# Patient Record
Sex: MALE | Race: WHITE | Employment: STUDENT | URBAN - METROPOLITAN AREA
[De-identification: names, ages, dates, MRNs, and addresses within clinical notes are randomized per-mention and may not be internally consistent; named-entity substitution may affect disease eponyms.]

---

## 2020-03-03 ENCOUNTER — HOSPITAL ENCOUNTER (OUTPATIENT)
Age: 20
Discharge: HOME | End: 2020-03-03
Attending: FAMILY MEDICINE
Payer: COMMERCIAL

## 2020-03-03 VITALS
RESPIRATION RATE: 18 BRPM | OXYGEN SATURATION: 96 % | SYSTOLIC BLOOD PRESSURE: 128 MMHG | HEART RATE: 101 BPM | TEMPERATURE: 101 F | DIASTOLIC BLOOD PRESSURE: 83 MMHG

## 2020-03-03 DIAGNOSIS — J10.1 INFLUENZA B: Primary | ICD-10-CM

## 2020-03-03 DIAGNOSIS — J02.9 ACUTE PHARYNGITIS, UNSPECIFIED ETIOLOGY: ICD-10-CM

## 2020-03-03 LAB
POCT INFLUENZA A: NEGATIVE
POCT INFLUENZA B: POSITIVE
POCT RAPID STREP: NEGATIVE

## 2020-03-03 PROCEDURE — 99204 OFFICE O/P NEW MOD 45 MIN: CPT

## 2020-03-03 PROCEDURE — 87147 CULTURE TYPE IMMUNOLOGIC: CPT | Performed by: FAMILY MEDICINE

## 2020-03-03 PROCEDURE — 87081 CULTURE SCREEN ONLY: CPT | Performed by: FAMILY MEDICINE

## 2020-03-03 PROCEDURE — 87502 INFLUENZA DNA AMP PROBE: CPT | Performed by: FAMILY MEDICINE

## 2020-03-03 PROCEDURE — 87430 STREP A AG IA: CPT | Performed by: FAMILY MEDICINE

## 2020-03-03 RX ORDER — OSELTAMIVIR PHOSPHATE 75 MG/1
75 CAPSULE ORAL 2 TIMES DAILY
Qty: 10 CAPSULE | Refills: 0 | Status: SHIPPED | OUTPATIENT
Start: 2020-03-03 | End: 2020-03-03

## 2020-03-03 RX ORDER — OSELTAMIVIR PHOSPHATE 75 MG/1
75 CAPSULE ORAL 2 TIMES DAILY
Qty: 10 CAPSULE | Refills: 0 | Status: SHIPPED | OUTPATIENT
Start: 2020-03-03 | End: 2020-03-08

## 2020-03-03 NOTE — ED PROVIDER NOTES
Patient presents with:  Sore Throat  Headache  Fever  Cough/URI    HPI:     Kendrick Pack is a 22year old male who presents with for chief complaint of fever, chills, nasal congestion, coryza, rhinorrhea, sore throat, cough, headache, myalgias, fatigue, ma mucosa moist.   Neck: no adenopathy  Lungs: clear to auscultation bilaterally. No wheezing, rhonchi or crackles. No chest wall retractions. No respiratory distress.  No tachypnea noted  Heart: S1, S2 normal, no murmur, click, rub or gallop, regular rate and not better in 3-4 days or as needed  Monitor for signs and symptoms of pneumonia  All results reviewed and discussed with patient. See AVS for detailed discharge instructions for your condition today.       Follow Up with:  Wally Armstrong, [address]

## 2020-03-05 RX ORDER — AMOXICILLIN 875 MG/1
875 TABLET, COATED ORAL 2 TIMES DAILY
Qty: 20 TABLET | Refills: 0 | Status: SHIPPED | OUTPATIENT
Start: 2020-03-05 | End: 2020-03-15

## 2023-05-24 ENCOUNTER — APPOINTMENT (OUTPATIENT)
Dept: GENERAL RADIOLOGY | Age: 23
End: 2023-05-24
Attending: NURSE PRACTITIONER
Payer: COMMERCIAL

## 2023-05-24 ENCOUNTER — HOSPITAL ENCOUNTER (OUTPATIENT)
Age: 23
Discharge: HOME | End: 2023-05-24
Payer: COMMERCIAL

## 2023-05-24 VITALS
WEIGHT: 230 LBS | HEART RATE: 115 BPM | TEMPERATURE: 100 F | DIASTOLIC BLOOD PRESSURE: 84 MMHG | SYSTOLIC BLOOD PRESSURE: 125 MMHG | RESPIRATION RATE: 18 BRPM | OXYGEN SATURATION: 95 % | BODY MASS INDEX: 34.07 KG/M2 | HEIGHT: 69 IN

## 2023-05-24 DIAGNOSIS — J18.9 COMMUNITY ACQUIRED PNEUMONIA OF LEFT LOWER LOBE OF LUNG: Primary | ICD-10-CM

## 2023-05-24 DIAGNOSIS — U07.1 COVID-19: ICD-10-CM

## 2023-05-24 PROCEDURE — 71046 X-RAY EXAM CHEST 2 VIEWS: CPT | Performed by: NURSE PRACTITIONER

## 2023-05-24 PROCEDURE — A9150 MISC/EXPER NON-PRESCRIPT DRU: HCPCS | Performed by: NURSE PRACTITIONER

## 2023-05-24 PROCEDURE — 99204 OFFICE O/P NEW MOD 45 MIN: CPT | Performed by: NURSE PRACTITIONER

## 2023-05-24 RX ORDER — AZITHROMYCIN 250 MG/1
TABLET, FILM COATED ORAL
Qty: 6 TABLET | Refills: 0 | Status: SHIPPED | OUTPATIENT
Start: 2023-05-24 | End: 2023-05-29

## 2023-05-24 RX ORDER — AMOXICILLIN 500 MG/1
1000 TABLET, FILM COATED ORAL 3 TIMES DAILY
Qty: 30 TABLET | Refills: 0 | Status: SHIPPED | OUTPATIENT
Start: 2023-05-24 | End: 2023-05-29

## 2023-05-24 RX ORDER — ALBUTEROL SULFATE 90 UG/1
2 AEROSOL, METERED RESPIRATORY (INHALATION) EVERY 4 HOURS PRN
Qty: 1 EACH | Refills: 0 | Status: SHIPPED | OUTPATIENT
Start: 2023-05-24 | End: 2023-06-23

## 2023-05-24 RX ORDER — BENZONATATE 100 MG/1
100 CAPSULE ORAL 3 TIMES DAILY PRN
Qty: 30 CAPSULE | Refills: 0 | Status: SHIPPED | OUTPATIENT
Start: 2023-05-24 | End: 2023-06-23

## 2023-05-24 RX ORDER — ACETAMINOPHEN 500 MG
1000 TABLET ORAL ONCE
Status: COMPLETED | OUTPATIENT
Start: 2023-05-24 | End: 2023-05-24

## 2023-05-24 NOTE — ED INITIAL ASSESSMENT (HPI)
Pt with c/o cough and congestion that started on Friday. Pt tested positive for covid on Thursday. Pt states cough has gotten worse.   C/o headache

## 2023-05-24 NOTE — DISCHARGE INSTRUCTIONS
Per the CDC recommendations persons with COVID-19 who have symptoms and were directed to the care for themselves at home may discontinue isolation of the following conditions. Stay home for 5 days. If you have no symptoms or your symptoms are resolving after 5 days you can leave your house. Continue to wear a mask around others for 5 additional days. If you have a fever continue stay at home until your fever resolves. If you are diagnosed with Covid, refrain from exercise until approved by your primary care physician. Please call your primary care provider within 2 to 3 days of your discharge to arrange a telehealth follow-up. CDC does not recommend repeat testing after positive test.  Take Tylenol and ibuprofen as needed for fever, body aches and chills. Over-the-counter multivitamins, take melatonin at night  Finish the course of both antibiotics for the pneumonia  Tessalon Perles for the cough  Inhaler as needed  Return to the emergency room from symptoms or concerns.

## 2023-06-04 ENCOUNTER — APPOINTMENT (OUTPATIENT)
Dept: GENERAL RADIOLOGY | Age: 23
End: 2023-06-04
Attending: NURSE PRACTITIONER
Payer: COMMERCIAL

## 2023-06-04 ENCOUNTER — HOSPITAL ENCOUNTER (OUTPATIENT)
Age: 23
Discharge: HOME | End: 2023-06-04
Payer: COMMERCIAL

## 2023-06-04 VITALS
SYSTOLIC BLOOD PRESSURE: 97 MMHG | DIASTOLIC BLOOD PRESSURE: 53 MMHG | HEART RATE: 98 BPM | TEMPERATURE: 99 F | OXYGEN SATURATION: 95 % | RESPIRATION RATE: 22 BRPM

## 2023-06-04 DIAGNOSIS — J12.82 PNEUMONIA DUE TO COVID-19 VIRUS: Primary | ICD-10-CM

## 2023-06-04 DIAGNOSIS — R06.00 DYSPNEA, UNSPECIFIED TYPE: ICD-10-CM

## 2023-06-04 DIAGNOSIS — U07.1 PNEUMONIA DUE TO COVID-19 VIRUS: Primary | ICD-10-CM

## 2023-06-04 PROCEDURE — 99214 OFFICE O/P EST MOD 30 MIN: CPT | Performed by: NURSE PRACTITIONER

## 2023-06-04 PROCEDURE — 71046 X-RAY EXAM CHEST 2 VIEWS: CPT | Performed by: NURSE PRACTITIONER

## 2023-06-04 RX ORDER — DOXYCYCLINE HYCLATE 100 MG/1
CAPSULE ORAL
COMMUNITY
Start: 2023-06-01

## 2023-06-04 RX ORDER — BENZONATATE 200 MG/1
200 CAPSULE ORAL 3 TIMES DAILY PRN
Qty: 30 CAPSULE | Refills: 0 | Status: SHIPPED | OUTPATIENT
Start: 2023-06-04 | End: 2023-07-04

## 2023-06-04 RX ORDER — GUAIFENESIN DEXTROMETHORPHAN HYDROBROMIDE ORAL SOLUTION 10; 100 MG/5ML; MG/5ML
10 SOLUTION ORAL EVERY 6 HOURS PRN
COMMUNITY
Start: 2023-06-01

## 2023-06-04 RX ORDER — FAMOTIDINE 20 MG/1
TABLET, FILM COATED ORAL
COMMUNITY
Start: 2023-06-01

## 2023-06-04 RX ORDER — ALBUTEROL SULFATE 90 UG/1
2 AEROSOL, METERED RESPIRATORY (INHALATION) EVERY 4 HOURS PRN
Qty: 1 EACH | Refills: 0 | Status: SHIPPED | OUTPATIENT
Start: 2023-06-04 | End: 2023-07-04

## 2023-06-04 RX ORDER — DEXAMETHASONE 2 MG/1
TABLET ORAL
COMMUNITY
Start: 2023-06-01

## 2023-06-04 NOTE — ED INITIAL ASSESSMENT (HPI)
Pt sts dx with covid pneumonia 5/24. Given rx meds, not improving, mom took to [facility] ER and was admitted for approx 1 week. Pt sts was feeling better. This morning noted difficulty breathing/SOB, fatigue, increased cough. Denies fever.

## 2023-06-04 NOTE — DISCHARGE INSTRUCTIONS
Rest and drink plenty of fluids. Continue to take the antibiotic and the steroid as prescribed. Make sure to finish both of these prescriptions. Take a probiotic daily to help with the GI side effects of the antibiotics. Use the albuterol inhaler 2 to 4 puffs every 4 hours to help with shortness of breath and congestion. You can also take an antihistamine over-the-counter such as Zyrtec and Claritin twice a day to help with nasal congestion and cough. Use Delsym, Robitussin, or Tessalon Perles as prescribed to help with cough suppression. You are going to have good days and bad days. It is not unusual that you may be coughing or feel short of breath for a few weeks as your pneumonia resolves. Follow-up with your PCP in the next 3 to 5 days.

## 2023-10-02 ENCOUNTER — OFFICE VISIT (OUTPATIENT)
Dept: FAMILY MEDICINE | Age: 23
End: 2023-10-02

## 2023-10-02 VITALS
WEIGHT: 253.5 LBS | TEMPERATURE: 96.4 F | DIASTOLIC BLOOD PRESSURE: 74 MMHG | BODY MASS INDEX: 37.55 KG/M2 | HEIGHT: 69 IN | HEART RATE: 89 BPM | OXYGEN SATURATION: 96 % | SYSTOLIC BLOOD PRESSURE: 112 MMHG

## 2023-10-02 DIAGNOSIS — S63.511A SPRAIN OF CARPAL JOINT OF RIGHT WRIST, INITIAL ENCOUNTER: Primary | ICD-10-CM

## 2023-10-02 PROCEDURE — 99203 OFFICE O/P NEW LOW 30 MIN: CPT | Performed by: NURSE PRACTITIONER

## 2024-01-31 ENCOUNTER — APPOINTMENT (OUTPATIENT)
Dept: GENERAL RADIOLOGY | Age: 24
End: 2024-01-31
Attending: NURSE PRACTITIONER
Payer: COMMERCIAL

## 2024-01-31 ENCOUNTER — HOSPITAL ENCOUNTER (OUTPATIENT)
Age: 24
Discharge: HOME | End: 2024-01-31
Payer: COMMERCIAL

## 2024-01-31 VITALS
WEIGHT: 254 LBS | TEMPERATURE: 98 F | RESPIRATION RATE: 16 BRPM | HEIGHT: 69 IN | OXYGEN SATURATION: 96 % | SYSTOLIC BLOOD PRESSURE: 123 MMHG | DIASTOLIC BLOOD PRESSURE: 70 MMHG | BODY MASS INDEX: 37.62 KG/M2 | HEART RATE: 94 BPM

## 2024-01-31 DIAGNOSIS — R50.9 FEVER: Primary | ICD-10-CM

## 2024-01-31 DIAGNOSIS — R05.1 ACUTE COUGH: ICD-10-CM

## 2024-01-31 DIAGNOSIS — R05.8 POST-VIRAL COUGH SYNDROME: ICD-10-CM

## 2024-01-31 LAB
POCT INFLUENZA A: NEGATIVE
POCT INFLUENZA B: NEGATIVE

## 2024-01-31 PROCEDURE — 99213 OFFICE O/P EST LOW 20 MIN: CPT | Performed by: NURSE PRACTITIONER

## 2024-01-31 PROCEDURE — 71046 X-RAY EXAM CHEST 2 VIEWS: CPT | Performed by: NURSE PRACTITIONER

## 2024-01-31 PROCEDURE — 87502 INFLUENZA DNA AMP PROBE: CPT | Performed by: NURSE PRACTITIONER

## 2024-01-31 RX ORDER — PREDNISONE 20 MG/1
20 TABLET ORAL 2 TIMES DAILY
Qty: 10 TABLET | Refills: 0 | Status: SHIPPED | OUTPATIENT
Start: 2024-01-31 | End: 2024-02-05

## 2024-01-31 RX ORDER — BENZONATATE 100 MG/1
100 CAPSULE ORAL 3 TIMES DAILY PRN
Qty: 30 CAPSULE | Refills: 0 | Status: SHIPPED | OUTPATIENT
Start: 2024-01-31 | End: 2024-03-01

## 2024-01-31 RX ORDER — ALBUTEROL SULFATE 90 UG/1
2 AEROSOL, METERED RESPIRATORY (INHALATION) EVERY 4 HOURS PRN
Qty: 1 EACH | Refills: 0 | Status: SHIPPED | OUTPATIENT
Start: 2024-01-31 | End: 2024-03-01

## 2024-01-31 NOTE — ED PROVIDER NOTES
Patient Seen in: [facility]      History     Chief Complaint   Patient presents with    Fever     Stated Complaint: cough /weezing /fever x this am    Subjective:   HPI  23-year-old male presents to the IC with complaints of fever that started this morning with a Tmax of 101.  Well-controlled with Tylenol Motrin.  Patient has had cough and wheezing ongoing for the past year ever since he had COVID.  States he had COVID again this past December has not follow-up with his primary care physician for the ongoing lingering cough.  Denies any chest pain palpitations denies any shortness of breath or any difficulty breathing.  Patient has no other issues with concerns with  The patient's medication list, past medical history and social history elements as listed in today's nurse's notes were reviewed and agreed (except as otherwise stated in the HPI).  The patient's family history reviewed and determined to be noncontributory to the presenting problem.      Pt with c/o fever today of 101.  Pt c/o cough and wheezing ongoing for the past year.  Pt states had covid in December.  Pt has not seen a primary physician for the ongoing cough     Objective:   Past Medical History:   Diagnosis Date    Asthma     Other  infants, unspecified (weight)(765.10)     Pneumonia due to COVID-19 virus 2023    Hospitalized for 1 week              Past Surgical History:   Procedure Laterality Date    EXCIS TESTIS LOCAL LESION      Pt does not recall having this surgery.                Social History     Socioeconomic History    Marital status: Single   Tobacco Use    Smoking status: Never    Smokeless tobacco: Never   Vaping Use    Vaping Use: Never used   Substance and Sexual Activity    Alcohol use: No     Alcohol/week: 0.0 standard drinks of alcohol    Drug use: No    Sexual activity: Never              Review of Systems    Positive for stated complaint: cough /weezing /fever x this am  Other systems are as noted in  HPI.  Constitutional and vital signs reviewed.      All other systems reviewed and negative except as noted above.    Physical Exam     ED Triage Vitals [01/31/24 1135]   /70   Pulse 110   Resp 16   Temp 97.8 °F (36.6 °C)   Temp src    SpO2 96 %   O2 Device None (Room air)       Current:/70   Pulse 94   Temp 97.8 °F (36.6 °C)   Resp 16   Ht 175.3 cm (5' 9\")   Wt 115.2 kg   SpO2 96%   BMI 37.51 kg/m²         Physical Exam    GENERAL: The patient is well-developed well-nourished nontoxic, non-ill-appearing  HEENT: Normocephalic.  Atraumatic.  Extraocular motions are intact  NECK: Supple.  No meningitic signs are noted.   CHEST/LUNGS: Respirations are clear, patient does have coarse sounds on expiration there is no respiratory distress noted.  HEART/CARDIOVASCULAR: Regular.  There is no tachycardia.   SKIN: There is no rash.  NEURO: The patient is awake, alert, and oriented.  The patient is cooperative.    ED Course     Labs Reviewed   POCT FLU TEST - Normal    Narrative:     This assay is a rapid molecular in vitro test utilizing nucleic acid amplification of influenza A and B viral RNA.     XR CHEST PA + LAT CHEST (CPT=71046)    Result Date: 1/31/2024  PROCEDURE:  XR CHEST PA + LAT CHEST (CPT=71046)  INDICATIONS:  cough /weezing /fever x this am  COMPARISON:  [facility], XR, XR CHEST PA + LAT CHEST (FNK=[id number]), 6/04/2023, 11:46 AM.  TECHNIQUE:  PA and lateral chest radiographs were obtained.  PATIENT STATED HISTORY: (As transcribed by Technologist)  The patient has a cough for several months. New development of headache and fever.    FINDINGS:  Normal heart size and pulmonary vascularity.  Coarse linear opacity left lower lobe corresponds to area of previous pneumonia on 6/4/2023.  No focal consolidation.  Blunted lateral left costophrenic angle without evidence of pleural effusion posteriorly.            CONCLUSION:  Interval resolution of left lower lobe pneumonia.  Residual  linear opacity may represent scarring or atelectasis.  Probable pleural scarring in lateral costophrenic sulcus versus trace pleural effusion.   LOCATION:  [id number]   Dictated by (CST): Ren Kelsey MD on 1/31/2024 at 12:23 PM     Finalized by (CST): Ren Kelsey MD on 1/31/2024 at 12:25 PM               MDM   Patient present with signs and symptoms consistent with upper respiratory infection and consider possible life-threatening etiologies are presenting complaint including severe bacterial infection, meningitis, acute cardiopulmonary process etc.  And doubt given; history, exam, chest x-ray with no acute cardiopulmonary process  and signs and symptoms which have markedly improved with management.  Suspect likely viral etiology of upper respiratory infection.  Patient afebrile with normal vital signs and patient nontoxic appearing, well-hydrated in no apparent distress and no respiratory distress, this will discharge to follow-up with primary care physician in 2-3 days.  Supportive care instructions provided.  Patient to take over-the-counter and Tylenol and Motrin as needed for fever and pain.  Advised to return to the emergency room if any new or worsening symptoms including but not limited to; change in mental status.  Meningeal signs, abdominal pain, nausea vomiting, chest pain/shortness of breath, new rash, decreased urinary output or any other concerns.    Please note that this report has been produced using speech recognition software and may contain errors related to that system including, but not limited to, errors in grammar, punctuation, and spelling, as well as words and phrases that possibly may have been recognized inappropriately.  If there are any questions or concerns, contact the dictating provider for clarification.                                   Medical Decision Making      Disposition and Plan     Clinical Impression:  1. Fever    2. Acute cough    3. Post-viral cough syndrome          Disposition:  Discharge  1/31/2024 12:28 pm    Follow-up:  [place]  [address] [address] [facility]  [address] [place] [id number]  [phone]                Medications Prescribed:  Discharge Medication List as of 1/31/2024 12:30 PM        START taking these medications    Details   albuterol 108 (90 Base) MCG/ACT Inhalation Aero Soln Inhale 2 puffs into the lungs every 4 (four) hours as needed for Wheezing., Normal, Disp-1 each, R-0      benzonatate 100 MG Oral Cap Take 1 capsule (100 mg total) by mouth 3 (three) times daily as needed for cough., Normal, Disp-30 capsule, R-0      predniSONE 20 MG Oral Tab Take 1 tablet (20 mg total) by mouth 2 (two) times daily for 5 days., Normal, Disp-10 tablet, R-0

## 2024-01-31 NOTE — DISCHARGE INSTRUCTIONS
Follow-up with your primary care provider for all of your healthcare needs  Increase fluids keep well-hydrated  Tessalon Perles for the cough use inhaler as needed  Steroids twice a day for 5 days  And the Zyrtec will also be helpful which is over-the-counter  Return to the emergency room for symptoms or concerns

## 2024-10-11 ENCOUNTER — TELEPHONE (OUTPATIENT)
Dept: FAMILY MEDICINE | Age: 24
End: 2024-10-11

## (undated) NOTE — LETTER
Date & Time: 1/31/2024, 12:30 PM  Patient: Germán Recinos  Encounter Provider(s):    Riccardo Hickman APRN       To Whom It May Concern:    Germán Recinos was seen and treated in our department on 1/31/2024. He should not return to school until 2/3/24 .    If you have any questions or concerns, please do not hesitate to call.        _____________________________  Physician/APC Signature

## (undated) NOTE — LETTER
[address] [facility] [address] [facility] [address]  Dept: [phone]  Dept Fax: [phone]         March 8, 2020    Patient: Pj French   YOB: 2000   Date of Visit: 3/3/2020       To Whom It May Concern:    Jaydon Palacio